# Patient Record
Sex: MALE | Race: BLACK OR AFRICAN AMERICAN | NOT HISPANIC OR LATINO | Employment: FULL TIME | URBAN - METROPOLITAN AREA
[De-identification: names, ages, dates, MRNs, and addresses within clinical notes are randomized per-mention and may not be internally consistent; named-entity substitution may affect disease eponyms.]

---

## 2017-06-23 ENCOUNTER — TRANSCRIBE ORDERS (OUTPATIENT)
Dept: ADMINISTRATIVE | Facility: HOSPITAL | Age: 67
End: 2017-06-23

## 2017-06-23 DIAGNOSIS — R01.1 UNDIAGNOSED CARDIAC MURMURS: Primary | ICD-10-CM

## 2017-07-11 ENCOUNTER — HOSPITAL ENCOUNTER (OUTPATIENT)
Dept: NON INVASIVE DIAGNOSTICS | Facility: HOSPITAL | Age: 67
Discharge: HOME | End: 2017-07-11
Payer: MEDICARE

## 2017-07-11 DIAGNOSIS — R01.1 UNDIAGNOSED CARDIAC MURMURS: ICD-10-CM

## 2017-07-11 PROCEDURE — 93306 TTE W/DOPPLER COMPLETE: CPT

## 2017-07-20 ENCOUNTER — HOSPITAL ENCOUNTER (OUTPATIENT)
Dept: NON INVASIVE DIAGNOSTICS | Facility: HOSPITAL | Age: 67
Discharge: HOME | End: 2017-07-20
Attending: INTERNAL MEDICINE
Payer: MEDICARE

## 2017-07-20 DIAGNOSIS — R01.1 CARDIAC MURMUR: ICD-10-CM

## 2017-07-20 PROCEDURE — 93308 TTE F-UP OR LMTD: CPT

## 2021-02-16 ENCOUNTER — TRANSCRIBE ORDERS (OUTPATIENT)
Dept: ADMINISTRATIVE | Facility: HOSPITAL | Age: 71
End: 2021-02-16

## 2021-02-16 DIAGNOSIS — I10 HYPERTENSION: Primary | ICD-10-CM

## 2021-02-24 ENCOUNTER — HOSPITAL ENCOUNTER (OUTPATIENT)
Dept: NON INVASIVE DIAGNOSTICS | Facility: HOSPITAL | Age: 71
Discharge: HOME | End: 2021-02-24
Payer: COMMERCIAL

## 2021-02-24 DIAGNOSIS — I10 HYPERTENSION: ICD-10-CM

## 2021-02-24 PROCEDURE — 93306 TTE W/DOPPLER COMPLETE: CPT

## 2021-02-24 PROCEDURE — 93306 TTE W/DOPPLER COMPLETE: CPT | Performed by: INTERNAL MEDICINE

## 2021-03-15 ENCOUNTER — IMMUNIZATIONS (OUTPATIENT)
Dept: FAMILY MEDICINE CLINIC | Facility: HOSPITAL | Age: 71
End: 2021-03-15

## 2021-03-15 DIAGNOSIS — Z23 ENCOUNTER FOR IMMUNIZATION: Primary | ICD-10-CM

## 2021-03-15 PROCEDURE — 91301 SARS-COV-2 / COVID-19 MRNA VACCINE (MODERNA) 100 MCG: CPT

## 2021-03-15 PROCEDURE — 0011A SARS-COV-2 / COVID-19 MRNA VACCINE (MODERNA) 100 MCG: CPT

## 2021-04-14 ENCOUNTER — IMMUNIZATIONS (OUTPATIENT)
Dept: FAMILY MEDICINE CLINIC | Facility: HOSPITAL | Age: 71
End: 2021-04-14

## 2021-04-14 DIAGNOSIS — Z23 ENCOUNTER FOR IMMUNIZATION: Primary | ICD-10-CM

## 2021-04-14 PROCEDURE — 91301 SARS-COV-2 / COVID-19 MRNA VACCINE (MODERNA) 100 MCG: CPT

## 2021-04-14 PROCEDURE — 0012A SARS-COV-2 / COVID-19 MRNA VACCINE (MODERNA) 100 MCG: CPT

## 2024-07-26 ENCOUNTER — HOSPITAL ENCOUNTER (EMERGENCY)
Dept: VASCULAR ULTRASOUND | Facility: HOSPITAL | Age: 74
Discharge: HOME | End: 2024-07-26
Payer: COMMERCIAL

## 2024-07-26 ENCOUNTER — APPOINTMENT (EMERGENCY)
Dept: RADIOLOGY | Facility: HOSPITAL | Age: 74
End: 2024-07-26
Payer: COMMERCIAL

## 2024-07-26 ENCOUNTER — TELEPHONE (OUTPATIENT)
Age: 74
End: 2024-07-26

## 2024-07-26 ENCOUNTER — HOSPITAL ENCOUNTER (EMERGENCY)
Facility: HOSPITAL | Age: 74
Discharge: HOME | End: 2024-07-26
Attending: EMERGENCY MEDICINE
Payer: COMMERCIAL

## 2024-07-26 VITALS
DIASTOLIC BLOOD PRESSURE: 75 MMHG | HEART RATE: 56 BPM | TEMPERATURE: 98.4 F | SYSTOLIC BLOOD PRESSURE: 168 MMHG | OXYGEN SATURATION: 98 % | RESPIRATION RATE: 18 BRPM

## 2024-07-26 DIAGNOSIS — I82.412 ACUTE DEEP VEIN THROMBOSIS (DVT) OF FEMORAL VEIN OF LEFT LOWER EXTREMITY (HCC): Primary | ICD-10-CM

## 2024-07-26 LAB
ALBUMIN SERPL BCG-MCNC: 4.2 G/DL (ref 3.5–5)
ALP SERPL-CCNC: 63 U/L (ref 34–104)
ALT SERPL W P-5'-P-CCNC: 21 U/L (ref 7–52)
ANION GAP SERPL CALCULATED.3IONS-SCNC: 8 MMOL/L (ref 4–13)
APTT PPP: 30 SECONDS (ref 23–37)
AST SERPL W P-5'-P-CCNC: 23 U/L (ref 13–39)
BASOPHILS # BLD AUTO: 0.05 THOUSANDS/ÂΜL (ref 0–0.1)
BASOPHILS NFR BLD AUTO: 1 % (ref 0–1)
BILIRUB SERPL-MCNC: 0.59 MG/DL (ref 0.2–1)
BUN SERPL-MCNC: 15 MG/DL (ref 5–25)
CALCIUM SERPL-MCNC: 9.7 MG/DL (ref 8.4–10.2)
CHLORIDE SERPL-SCNC: 104 MMOL/L (ref 96–108)
CK SERPL-CCNC: 165 U/L (ref 39–308)
CO2 SERPL-SCNC: 26 MMOL/L (ref 21–32)
CREAT SERPL-MCNC: 1.07 MG/DL (ref 0.6–1.3)
D DIMER PPP FEU-MCNC: 2.82 UG/ML FEU
DEPRECATED AT III PPP: 91 % OF NORMAL (ref 92–136)
EOSINOPHIL # BLD AUTO: 0.16 THOUSAND/ÂΜL (ref 0–0.61)
EOSINOPHIL NFR BLD AUTO: 3 % (ref 0–6)
ERYTHROCYTE [DISTWIDTH] IN BLOOD BY AUTOMATED COUNT: 11.8 % (ref 11.6–15.1)
GFR SERPL CREATININE-BSD FRML MDRD: 68 ML/MIN/1.73SQ M
GLUCOSE SERPL-MCNC: 119 MG/DL (ref 65–140)
HCT VFR BLD AUTO: 39.8 % (ref 36.5–49.3)
HGB BLD-MCNC: 12.9 G/DL (ref 12–17)
IMM GRANULOCYTES # BLD AUTO: 0.02 THOUSAND/UL (ref 0–0.2)
IMM GRANULOCYTES NFR BLD AUTO: 0 % (ref 0–2)
INR PPP: 1 (ref 0.84–1.19)
LYMPHOCYTES # BLD AUTO: 1.79 THOUSANDS/ÂΜL (ref 0.6–4.47)
LYMPHOCYTES NFR BLD AUTO: 34 % (ref 14–44)
MCH RBC QN AUTO: 29.2 PG (ref 26.8–34.3)
MCHC RBC AUTO-ENTMCNC: 32.4 G/DL (ref 31.4–37.4)
MCV RBC AUTO: 90 FL (ref 82–98)
MONOCYTES # BLD AUTO: 0.64 THOUSAND/ÂΜL (ref 0.17–1.22)
MONOCYTES NFR BLD AUTO: 12 % (ref 4–12)
NEUTROPHILS # BLD AUTO: 2.62 THOUSANDS/ÂΜL (ref 1.85–7.62)
NEUTS SEG NFR BLD AUTO: 50 % (ref 43–75)
NRBC BLD AUTO-RTO: 0 /100 WBCS
PLATELET # BLD AUTO: 191 THOUSANDS/UL (ref 149–390)
PMV BLD AUTO: 10 FL (ref 8.9–12.7)
POTASSIUM SERPL-SCNC: 3.8 MMOL/L (ref 3.5–5.3)
PROT SERPL-MCNC: 7.2 G/DL (ref 6.4–8.4)
PROTHROMBIN TIME: 13.8 SECONDS (ref 11.6–14.5)
RBC # BLD AUTO: 4.42 MILLION/UL (ref 3.88–5.62)
SODIUM SERPL-SCNC: 138 MMOL/L (ref 135–147)
WBC # BLD AUTO: 5.28 THOUSAND/UL (ref 4.31–10.16)

## 2024-07-26 PROCEDURE — 85306 CLOT INHIBIT PROT S FREE: CPT

## 2024-07-26 PROCEDURE — 96374 THER/PROPH/DIAG INJ IV PUSH: CPT

## 2024-07-26 PROCEDURE — 85732 THROMBOPLASTIN TIME PARTIAL: CPT

## 2024-07-26 PROCEDURE — 85598 HEXAGNAL PHOSPH PLTLT NEUTRL: CPT

## 2024-07-26 PROCEDURE — 85379 FIBRIN DEGRADATION QUANT: CPT | Performed by: EMERGENCY MEDICINE

## 2024-07-26 PROCEDURE — 93971 EXTREMITY STUDY: CPT

## 2024-07-26 PROCEDURE — 85025 COMPLETE CBC W/AUTO DIFF WBC: CPT

## 2024-07-26 PROCEDURE — 82550 ASSAY OF CK (CPK): CPT | Performed by: EMERGENCY MEDICINE

## 2024-07-26 PROCEDURE — 73590 X-RAY EXAM OF LOWER LEG: CPT

## 2024-07-26 PROCEDURE — 93971 EXTREMITY STUDY: CPT | Performed by: SURGERY

## 2024-07-26 PROCEDURE — 80053 COMPREHEN METABOLIC PANEL: CPT

## 2024-07-26 PROCEDURE — 85613 RUSSELL VIPER VENOM DILUTED: CPT

## 2024-07-26 PROCEDURE — 99284 EMERGENCY DEPT VISIT MOD MDM: CPT

## 2024-07-26 PROCEDURE — 85705 THROMBOPLASTIN INHIBITION: CPT

## 2024-07-26 PROCEDURE — 85670 THROMBIN TIME PLASMA: CPT

## 2024-07-26 PROCEDURE — 85610 PROTHROMBIN TIME: CPT

## 2024-07-26 PROCEDURE — 85303 CLOT INHIBIT PROT C ACTIVITY: CPT

## 2024-07-26 PROCEDURE — 99285 EMERGENCY DEPT VISIT HI MDM: CPT | Performed by: EMERGENCY MEDICINE

## 2024-07-26 PROCEDURE — 85305 CLOT INHIBIT PROT S TOTAL: CPT

## 2024-07-26 PROCEDURE — 86146 BETA-2 GLYCOPROTEIN ANTIBODY: CPT

## 2024-07-26 PROCEDURE — 85300 ANTITHROMBIN III ACTIVITY: CPT

## 2024-07-26 PROCEDURE — 86147 CARDIOLIPIN ANTIBODY EA IG: CPT

## 2024-07-26 PROCEDURE — 36415 COLL VENOUS BLD VENIPUNCTURE: CPT

## 2024-07-26 PROCEDURE — 85730 THROMBOPLASTIN TIME PARTIAL: CPT

## 2024-07-26 RX ORDER — ACETAMINOPHEN 325 MG/1
975 TABLET ORAL ONCE
Status: COMPLETED | OUTPATIENT
Start: 2024-07-26 | End: 2024-07-26

## 2024-07-26 RX ORDER — KETOROLAC TROMETHAMINE 30 MG/ML
15 INJECTION, SOLUTION INTRAMUSCULAR; INTRAVENOUS ONCE
Status: COMPLETED | OUTPATIENT
Start: 2024-07-26 | End: 2024-07-26

## 2024-07-26 RX ADMIN — KETOROLAC TROMETHAMINE 15 MG: 30 INJECTION, SOLUTION INTRAMUSCULAR; INTRAVENOUS at 02:58

## 2024-07-26 RX ADMIN — APIXABAN 10 MG: 5 TABLET, FILM COATED ORAL at 08:29

## 2024-07-26 RX ADMIN — ACETAMINOPHEN 975 MG: 325 TABLET, FILM COATED ORAL at 02:57

## 2024-07-26 NOTE — TELEPHONE ENCOUNTER
Pt's wife Mey calling to schedule DVT hospital f/u appt. Pt has LEV 7/26/24 showing acute DVT. Pt has eliquis supply for next 28 days. Per protocol looking for 3 week appt. Given appt on 9/6/24 with [facility] in [place]. Please review and call pt for an appt within timeframe, they are willing to travel and can be reached at either phone number in chart.

## 2024-07-26 NOTE — ED PROVIDER NOTES
History  Chief Complaint   Patient presents with    Leg Pain     Pt reports leg pain and swelling in left lower leg starting a couple days ago. Denies injury     HPI    73-year-old male presents for evaluation of left leg pain.  He reports 4 days of left lower extremity pain, swelling that acutely worsened overnight.  He saw his PCP 2 days ago, and has a scheduled vascular ultrasound to rule out DVT next Wednesday.  However, pain intensified overnight, and woke him up from sleep, so he presented to ED.  Denies recent travel, long immobilization, cancer diagnosis.  Denies nausea, vomiting, abdominal pain, chest pain, SOB.    None       Past Medical History:   Diagnosis Date    Hypertension        History reviewed. No pertinent surgical history.    History reviewed. No pertinent family history.  I have reviewed and agree with the history as documented.    E-Cigarette/Vaping     E-Cigarette/Vaping Substances     Social History     Tobacco Use    Smoking status: Never    Smokeless tobacco: Never   Substance Use Topics    Alcohol use: Never    Drug use: Never        Review of Systems   Constitutional:  Negative for chills and fever.   Respiratory:  Negative for chest tightness and shortness of breath.    Cardiovascular:  Negative for chest pain.   Gastrointestinal:  Negative for abdominal pain, nausea and vomiting.   Musculoskeletal:  Positive for myalgias.   Neurological:  Negative for dizziness and headaches.       Physical Exam  ED Triage Vitals   Temperature Pulse Respirations Blood Pressure SpO2   07/26/24 0202 07/26/24 0201 07/26/24 0201 07/26/24 0202 07/26/24 0202   98.4 °F (36.9 °C) 78 20 (!) 202/91 97 %      Temp Source Heart Rate Source Patient Position - Orthostatic VS BP Location FiO2 (%)   07/26/24 0202 07/26/24 0201 07/26/24 0300 07/26/24 0300 --   Oral Monitor Sitting Right arm       Pain Score       07/26/24 0257       5             Orthostatic Vital Signs  Vitals:    07/26/24 0300 07/26/24 0400 07/26/24  0530 07/26/24 0630   BP: (!) 184/83 (!) 173/77 (!) 173/81 168/75   Pulse: 64 56 57 56   Patient Position - Orthostatic VS: Sitting Sitting Sitting Sitting       Physical Exam  Vitals and nursing note reviewed.   Constitutional:       General: He is not in acute distress.     Appearance: He is well-developed.   HENT:      Head: Normocephalic and atraumatic.   Eyes:      Conjunctiva/sclera: Conjunctivae normal.   Cardiovascular:      Rate and Rhythm: Normal rate and regular rhythm.      Heart sounds: No murmur heard.  Pulmonary:      Effort: Pulmonary effort is normal. No respiratory distress.      Breath sounds: Normal breath sounds.   Abdominal:      Palpations: Abdomen is soft.      Tenderness: There is no abdominal tenderness.   Musculoskeletal:         General: Swelling present.      Cervical back: Neck supple.      Comments: Tenderness to palpation in left calf, with swelling also noted. LLE > RLE.  DP pulses normal, full ROM in BLE joints.   Skin:     General: Skin is warm and dry.      Capillary Refill: Capillary refill takes less than 2 seconds.   Neurological:      General: No focal deficit present.      Mental Status: He is alert and oriented to person, place, and time.         ED Medications  Medications   ketorolac (TORADOL) injection 15 mg (15 mg Intravenous Given 7/26/24 0258)   acetaminophen (TYLENOL) tablet 975 mg (975 mg Oral Given 7/26/24 0257)   apixaban (ELIQUIS) tablet 10 mg (10 mg Oral Given 7/26/24 0829)       Diagnostic Studies  Results Reviewed       Procedure Component Value Units Date/Time    Antithrombin III Activity [[id number]] Collected: 07/26/24 0804    Lab Status: In process Specimen: Blood from Arm, Left Updated: 07/26/24 0818    Protein S activity [[id number]] Collected: 07/26/24 0804    Lab Status: In process Specimen: Blood from Arm, Left Updated: 07/26/24 0818    Cardiolipin antibody [[id number]] Collected: 07/26/24 0804    Lab Status: In process Specimen: Blood from Arm, Left  Updated: 07/26/24 0818    Lupus anticoagulant [[id number]] Collected: 07/26/24 0804    Lab Status: In process Specimen: Blood from Arm, Left Updated: 07/26/24 0818    Beta-2 glycoprotein antibodies [[id number]] Collected: 07/26/24 0804    Lab Status: In process Specimen: Blood from Arm, Left Updated: 07/26/24 0818    Protein C activity [[id number]] Collected: 07/26/24 0804    Lab Status: In process Specimen: Blood from Arm, Left Updated: 07/26/24 0818    Protein S Antigen, Total & Free [[id number]] Collected: 07/26/24 0804    Lab Status: In process Specimen: Blood from Arm, Left Updated: 07/26/24 0818    Comprehensive metabolic panel [[id number]] Collected: 07/26/24 0254    Lab Status: Final result Specimen: Blood from Arm, Left Updated: 07/26/24 0329     Sodium 138 mmol/L      Potassium 3.8 mmol/L      Chloride 104 mmol/L      CO2 26 mmol/L      ANION GAP 8 mmol/L      BUN 15 mg/dL      Creatinine 1.07 mg/dL      Glucose 119 mg/dL      Calcium 9.7 mg/dL      AST 23 U/L      ALT 21 U/L      Alkaline Phosphatase 63 U/L      Total Protein 7.2 g/dL      Albumin 4.2 g/dL      Total Bilirubin 0.59 mg/dL      eGFR 68 ml/min/1.73sq m     Narrative:      National Kidney Disease Foundation guidelines for Chronic Kidney Disease (CKD):     Stage 1 with normal or high GFR (GFR > 90 mL/min/1.73 square meters)    Stage 2 Mild CKD (GFR = 60-89 mL/min/1.73 square meters)    Stage 3A Moderate CKD (GFR = 45-59 mL/min/1.73 square meters)    Stage 3B Moderate CKD (GFR = 30-44 mL/min/1.73 square meters)    Stage 4 Severe CKD (GFR = 15-29 mL/min/1.73 square meters)    Stage 5 End Stage CKD (GFR <15 mL/min/1.73 square meters)  Note: GFR calculation is accurate only with a steady state creatinine    CK [[id number]]  (Normal) Collected: 07/26/24 0254    Lab Status: Final result Specimen: Blood from Arm, Left Updated: 07/26/24 0329     Total  U/L     D-Dimer [[id number]]  (Abnormal) Collected: 07/26/24 0254    Lab Status: Final result  Specimen: Blood from Arm, Left Updated: 07/26/24 0324     D-Dimer, Quant 2.82 ug/ml FEU     Narrative:      In the evaluation for possible pulmonary embolism, in the appropriate (Well's Score of 4 or less) patient, the age adjusted d-dimer cutoff for this patient can be calculated as:    Age x 0.01 (in ug/mL) for Age-adjusted D-dimer exclusion threshold for a patient over 50 years.    Protime-INR [[id number]]  (Normal) Collected: 07/26/24 0254    Lab Status: Final result Specimen: Blood from Arm, Left Updated: 07/26/24 0322     Protime 13.8 seconds      INR 1.00    APTT [[id number]]  (Normal) Collected: 07/26/24 0254    Lab Status: Final result Specimen: Blood from Arm, Left Updated: 07/26/24 0322     PTT 30 seconds     CBC and differential [[id number]] Collected: 07/26/24 0254    Lab Status: Final result Specimen: Blood from Arm, Left Updated: 07/26/24 0316     WBC 5.28 Thousand/uL      RBC 4.42 Million/uL      Hemoglobin 12.9 g/dL      Hematocrit 39.8 %      MCV 90 fL      MCH 29.2 pg      MCHC 32.4 g/dL      RDW 11.8 %      MPV 10.0 fL      Platelets 191 Thousands/uL      nRBC 0 /100 WBCs      Segmented % 50 %      Immature Grans % 0 %      Lymphocytes % 34 %      Monocytes % 12 %      Eosinophils Relative 3 %      Basophils Relative 1 %      Absolute Neutrophils 2.62 Thousands/µL      Absolute Immature Grans 0.02 Thousand/uL      Absolute Lymphocytes 1.79 Thousands/µL      Absolute Monocytes 0.64 Thousand/µL      Eosinophils Absolute 0.16 Thousand/µL      Basophils Absolute 0.05 Thousands/µL                    XR tibia fibula 2 vw left   ED Interpretation by Rochelle Hernandez MD (07/26 0528)   Wet read: No acute fractures or dislocations noted.      VAS lower limb venous duplex study, unilateral/limited    (Results Pending)         Procedures  Procedures      ED Course                                   Andres' Criteria for DVT      Flowsheet Row Most Recent Value   Andres' Criteria for DVT    Active cancer  Treatment or palliation within 6 months 0 Filed at: 07/26/2024 0746   Bedridden recently >3 days or major surgery within 12 weeks 0 Filed at: 07/26/2024 0746   Calf swelling >3 cm compared to the other leg 1 Filed at: 07/26/2024 0746   Entire leg swollen 0 Filed at: 07/26/2024 0746   Collateral (nonvaricose) superficial veins present 0 Filed at: 07/26/2024 0746   Localized tenderness along the deep venous system 1 Filed at: 07/26/2024 0746   Pitting edema, confined to symptomatic leg 0 Filed at: 07/26/2024 0746   Paralysis, paresis, or recent plaster immobilization of the lower extremity 0 Filed at: 07/26/2024 0746   Previously documented DVT 0 Filed at: 07/26/2024 0746   Alternative diagnosis to DVT as likely or more likely 0 Filed at: 07/26/2024 0746   Wells DVT Critera Score 2 Filed at: 07/26/2024 0746            Medical Decision Making  73-year-old male presents for evaluation of lower extremity pain.    On initial evaluation, patient in no acute distress, resting comfortably in bed.  Lower extremity asymmetry noted, with LLE >RLE.  Tenderness of L calf, with swelling noted.  Physical exam otherwise unremarkable.  High suspicion for DVT.  Labs, x-ray of LLE, D-dimer ordered.  Toradol and Tylenol given for symptomatic relief.  Vascular ultrasound ordered.  Per vascular tech, patient positive for DVT in left mid and distal femoral, popliteal and proximal posterior and peroneal veins.  Patient started on Eliquis, first dose given in ED.  Referral to vascular surgery placed, and thrombosis panel.  Strict return precautions discussed with patient, and he is stable for dispo.    Amount and/or Complexity of Data Reviewed  Labs: ordered.  Radiology: ordered and independent interpretation performed.    Risk  OTC drugs.  Prescription drug management.          Disposition  Final diagnoses:   Acute deep vein thrombosis (DVT) of femoral vein of left lower extremity (HCC)     Time reflects when diagnosis was documented in  both MDM as applicable and the Disposition within this note       Time User Action Codes Description Comment    7/26/2024  7:57 AM Rochelle Hernandez Add [I82.412] Acute deep vein thrombosis (DVT) of femoral vein of left lower extremity (HCC)           ED Disposition       ED Disposition   Discharge    Condition   Stable    Date/Time   Fri Jul 26, 2024 0801    Comment   Jeffry Mcclellan discharge to home/self care.                   Follow-up Information       Follow up With Specialties Details Why Contact Info    Karli Marsh MD Family Medicine Schedule an appointment as soon as possible for a visit   [address] [facility] [address]  [place] [address]  [phone]              Patient's Medications   Discharge Prescriptions    APIXABAN (ELIQUIS) 5 MG    Take 2 tablets (10 mg total) by mouth 2 (two) times a day for 7 days, THEN 1 tablet (5 mg total) 2 (two) times a day for 21 days. Then 5 mg (one tablet) twice daily after 1 week..       Start Date: 7/26/2024 End Date: 8/23/2024       Order Dose: --       Quantity: 70 tablet    Refills: 0         PDMP Review         Value Time User    PDMP Reviewed  Yes 7/26/2024  1:57 AM Phi Obando MD             ED Provider  Attending physically available and evaluated Jeffry Mcclellan. I managed the patient along with the ED Attending.    Electronically Signed by           Rochelle Hernandez MD  07/26/24 0859

## 2024-07-26 NOTE — ED ATTENDING ATTESTATION
7/26/2024  I, Phi Obando MD, saw and evaluated the patient. I have discussed the patient with the resident/non-physician practitioner and agree with the resident's/non-physician practitioner's findings, Plan of Care, and MDM as documented in the resident's/non-physician practitioner's note, except where noted. All available labs and Radiology studies were reviewed.  I was present for key portions of any procedure(s) performed by the resident/non-physician practitioner and I was immediately available to provide assistance.       At this point I agree with the current assessment done in the Emergency Department.  I have conducted an independent evaluation of this patient a history and physical is as follows: Patient is a 73 year old male with 4-5 days of left lower leg swelling and pain. No trauma. No travel. No sob. No fever. No N/V. No cough. No chest pain. Was last seen at [facility] ED on 6/5/17 for gout of left knee. [facility] website checked on this patient and no Rx found. NCAT. Moist mucous membranes. Lungs clear. Heart regular without murmur.  Abdomen soft and nontender. Good bowel sounds. No rash noted. (+) left calf tenderness with mild swelling. NVI. DDX including but not limited to: DVT, Baker's cyst, cellulitis, strain, rhabdomyolysis, metabolic abnormality, fracture; doubt arterial occlusion. Will check labs and x-ray and get doppler US in AM.     ED Course         Critical Care Time  Procedures

## 2024-07-29 LAB
APTT HEX PL PPP: 4 SEC (ref 0–11)
APTT SCREEN TO CONFIRM RATIO: 1.17 RATIO (ref 0–1.34)
APTT-LA IMM 4:1 NP PPP: 44.3 SEC (ref 0–40.5)
CONFIRM APTT/NORMAL: 35.1 SEC (ref 0–47.6)
DRVVT IMM 1:2 NP PPP: 43.7 SEC (ref 0–40.4)
DRVVT SCREEN TO CONFIRM RATIO: 1.3 RATIO (ref 0.8–1.2)
LA PPP-IMP: ABNORMAL
PROT C AG ACT/NOR PPP IA: 95 % OF NORMAL (ref 60–150)
PROT S ACT/NOR PPP: 101 % (ref 61–136)
PROT S ACT/NOR PPP: 93 % (ref 71–117)
PROT S PPP-ACNC: 107 % (ref 60–150)
SCREEN APTT: 48.1 SEC (ref 0–43.5)
SCREEN DRVVT: 51.4 SEC (ref 0–47)
THROMBIN TIME: 17.8 SEC (ref 0–23)

## 2024-07-30 LAB
B2 GLYCOPROT1 IGA SERPL IA-ACNC: 1.6
B2 GLYCOPROT1 IGG SERPL IA-ACNC: 0.9
B2 GLYCOPROT1 IGM SERPL IA-ACNC: <2.4
CARDIOLIPIN IGA SER IA-ACNC: 4.1
CARDIOLIPIN IGG SER IA-ACNC: 0.9
CARDIOLIPIN IGM SER IA-ACNC: 1

## 2024-09-04 ENCOUNTER — TELEPHONE (OUTPATIENT)
Dept: VASCULAR SURGERY | Facility: HOSPITAL | Age: 74
End: 2024-09-04

## 2024-10-08 ENCOUNTER — CONSULT (OUTPATIENT)
Dept: HEMATOLOGY ONCOLOGY | Facility: CLINIC | Age: 74
End: 2024-10-08
Payer: COMMERCIAL

## 2024-10-08 VITALS
WEIGHT: 170 LBS | TEMPERATURE: 97.9 F | SYSTOLIC BLOOD PRESSURE: 180 MMHG | RESPIRATION RATE: 17 BRPM | OXYGEN SATURATION: 97 % | HEIGHT: 65 IN | BODY MASS INDEX: 28.32 KG/M2 | HEART RATE: 78 BPM | DIASTOLIC BLOOD PRESSURE: 100 MMHG

## 2024-10-08 DIAGNOSIS — I82.412 ACUTE DEEP VEIN THROMBOSIS (DVT) OF FEMORAL VEIN OF LEFT LOWER EXTREMITY (HCC): ICD-10-CM

## 2024-10-08 PROCEDURE — 99203 OFFICE O/P NEW LOW 30 MIN: CPT

## 2024-10-08 RX ORDER — AMLODIPINE BESYLATE 5 MG/1
5 TABLET ORAL DAILY
COMMUNITY

## 2024-10-08 RX ORDER — LOSARTAN POTASSIUM 100 MG/1
25 TABLET ORAL DAILY
COMMUNITY

## 2024-10-08 NOTE — PROGRESS NOTES
"  Oncology Outpatient Consult Note  Jeffry Mcclellan 73 y.o. male MRN: @ Encounter: [id number]        Date:  10/8/2024        CC:  Acute LLE DVT      HPI:  Jeffry Mcclellan is seen for initial consultation 10/8/2024 regarding acute LLE DVT.  Patient has a PMH of hypertension.      Patient presented to the ED on 7/26/2024 with LLE edema/pain for over a week.  A venous duplex revealed:  Evidence of acute and subacute vs. acute deep vein thrombosis was noted in the mid and distal femoral, popliteal and proximal posterior and peroneal veins.  No evidence of superficial thrombophlebitis noted.  Doppler evaluation shows a normal response to augmentation maneuvers.  Popliteal, posterior tibial and anterior tibial arterial Doppler waveform's are triphasic  Patient was started on Eliquis BID.      Patient denies recent surgeries, trauma, COVID infection, long distance travel, or prolonged immobility.  Patient reports he has not had a colonoscopy but had a cologuard test \"a few years ago,\" which, per patient, was negative. His PSA 12/2023 was 1.02.      Patient is currently working.  He denies increased fatigue, fevers, frequent infections, drenching night sweats, unintentional weight loss, decreased appetite.  No abdominal pain, diarrhea or constipation.    Thrombosis panel:  Protein S Ag, Total = 107%  Protein S Ag, Free = 101 %  Protein S Activity = 93%  Protein C Activity = 95%  Antithrombin III activity = 91%    Lupus anticoagulant = DETECTED  Beta-2 glycoprotein antibodies:  IgG = 0.9  IgA = 1.6  IgM = <2.4    Anticardiolipin antibodies:  IgG = 0.9  IgA = 4.1  IgM = 1.0    Patient reports no family history of blood clots or cancer.    ROS: As stated in history of present illness otherwise her 14 point review of systems today was negative.    ECOG PS: 0  Test Results:    Imaging: No results found.    Labs:   Lab Results   Component Value Date    WBC 5.28 07/26/2024    HGB 12.9 07/26/2024    HCT 39.8 07/26/2024    MCV 90 " 07/26/2024     07/26/2024     Lab Results   Component Value Date    K 3.8 07/26/2024     07/26/2024    CO2 26 07/26/2024    BUN 15 07/26/2024    CREATININE 1.07 07/26/2024    CALCIUM 9.7 07/26/2024    AST 23 07/26/2024    ALT 21 07/26/2024    ALKPHOS 63 07/26/2024    EGFR 68 07/26/2024   Active Problems: There is no problem list on file for this patient.      Past Medical History:   Past Medical History:   Diagnosis Date    Hypertension        Surgical History: No past surgical history on file.    Family History:  No family history on file.    Cancer-related family history is not on file.    Social History:   Social History     Socioeconomic History    Marital status: /Civil Union     Spouse name: Not on file    Number of children: Not on file    Years of education: Not on file    Highest education level: Not on file   Occupational History    Not on file   Tobacco Use    Smoking status: Never    Smokeless tobacco: Never   Substance and Sexual Activity    Alcohol use: Never    Drug use: Never    Sexual activity: Not on file   Other Topics Concern    Not on file   Social History Narrative    Not on file     Social Determinants of Health     Financial Resource Strain: Not on file   Food Insecurity: Not on file   Transportation Needs: Not on file   Physical Activity: Not on file   Stress: Not on file   Social Connections: Not on file   Intimate Partner Violence: Not on file   Housing Stability: Not on file       Current Medications:   Current Outpatient Medications   Medication Sig Dispense Refill    amLODIPine (NORVASC) 5 mg tablet Take 5 mg by mouth daily      losartan (COZAAR) 100 MG tablet Take 25 mg by mouth daily      apixaban (ELIQUIS) 5 mg Take 2 tablets (10 mg total) by mouth 2 (two) times a day for 7 days, THEN 1 tablet (5 mg total) 2 (two) times a day for 21 days. Then 5 mg (one tablet) twice daily after 1 week.. 70 tablet 0     No current facility-administered medications for this visit.        Allergies: No Known Allergies      Physical Exam:    Body surface area is 1.85 meters squared.    Wt Readings from Last 3 Encounters:   10/08/24 77.1 kg (170 lb)        Temp Readings from Last 3 Encounters:   10/08/24 97.9 °F (36.6 °C)   07/26/24 98.4 °F (36.9 °C) (Oral)        BP Readings from Last 3 Encounters:   10/08/24 (!) 180/100   07/26/24 168/75         Pulse Readings from Last 3 Encounters:   10/08/24 78   07/26/24 56     @LASTSAO2(3)@    Physical Exam     Constitutional   General appearance: No acute distress, well appearing and well nourished.    Eyes   Conjunctiva and lids: No swelling, erythema or discharge.    Pupils and irises: Equal, round and reactive to light.    Ears, Nose, Mouth, and Throat   External inspection of ears and nose: Normal.    Nasal mucosa, septum, and turbinates: Normal without edema or erythema.    Oropharynx: Normal with no erythema, edema, exudate or lesions.    Pulmonary   Respiratory effort: No increased work of breathing or signs of respiratory distress.    Auscultation of lungs: Clear to auscultation.    Cardiovascular   Palpation of heart: Normal PMI, no thrills.    Auscultation of heart: Normal rate and rhythm, normal S1 and S2, without murmurs.    Examination of extremities for edema and/or varicosities: Normal.    Carotid pulses: Normal.    Abdomen   Abdomen: Non-tender, no masses.    Liver and spleen: No hepatomegaly or splenomegaly.    Lymphatic   Palpation of lymph nodes in neck: No lymphadenopathy.    Musculoskeletal   Gait and station: Normal.    Digits and nails: Normal without clubbing or cyanosis.    Inspection/palpation of joints, bones, and muscles: Normal.    Skin   Skin and subcutaneous tissue: Normal without rashes or lesions.    Neurologic   Cranial nerves: Cranial nerves 2-12 intact.    Sensation: No sensory loss.    Psychiatric   Orientation to person, place, and time: Normal.    Mood and affect: Normal.          Assessment/ Plan:  73-year old with  an acute LLE DVT.  Discussed provoking risk factors for VTEs, which include but not limited to, increasing age, prolonged immobility, surgery, trauma, malignancy, , CHF, blood disorders that alter blood viscosity.  We reviewed patient's clot was unprovoked.  Since it is unprovoked, patient's age, recommend anticoagulation indefinitely.    We reviewed his thrombosis panel.  The presence of a lupus anticoagulant was detected. Discussed we have to repeat in 12 weeks for persistent positive however, the results are effected by anticoagulation such as Eliquis and may cause a false positive.      A study conducted suggests to hold Xa inhibitors (Xarelto and Eliquis) the prior to and the day of testing allows for valid assessment of lupus anticoagulant testing Lupus Anticoagulant Testing in Patients Receiving Rivaroxaban or Apixaban for the Treatment of Venous Thromboembolism  Blood  American Society of Hematology (ashpublications.org)     Recommend he hold it the day before and the day of testing.  Recommend he take ASA 81 mg on those days.  He can restart eliquis right after he obtains the labs.  During the time he is off anticoagulation, be vigilant of his symptoms and to report to the ED should he suspect a blood clot.    If the lupus anticoagulant is positive, recommend patient be on Coumadin with INR goal between 2.5 and 3.5.    Discussed the importance of compliance with taking the medication every day around the same time and not missing a dose.  If a dose is missed, NOT to take a double dose the next day to make up for the missed dose.    Discussed safety concerns while on anticoagulation:  Ambulate in a clear path, avoid falls.  If patient falls, to call 911 and get to the hospital, especially if there is a head injury.    Patient understands to always monitor for signs and symptoms of VTE which I have educated him on. He knows to report to the ED should these occur.    I will call patient with the test results  and formulate a plan. He is aware to contact us for any additional questions/concerns.    I spent 40 minutes in chart review, face to face counseling, coordination of care, and documentation.

## 2024-10-11 ENCOUNTER — TELEPHONE (OUTPATIENT)
Age: 74
End: 2024-10-11

## 2024-10-11 NOTE — TELEPHONE ENCOUNTER
"Patient's wife calling in to get clarification on upcoming lab test and how to take/stop Eliquis.     Per ESTRELLITA Ramirez's note on 10/8:    \"The presence of a lupus anticoagulant was detected. Discussed we have to repeat in 12 weeks for persistent positive however, the results are effected by anticoagulation such as Eliquis and may cause a false positive.       Recommend he hold it the day before and the day of testing.  Recommend he take ASA 81 mg on those days.  He can restart eliquis right after he obtains the labs.  During the time he is off anticoagulation, be vigilant of his symptoms and to report to the ED should he suspect a blood clot.\"    Reviewed the above recommendations with patient's wife. Patient had initial test done on 7/26. Wife would like to clarify when to have blood work drawn, as she was under the impression the labs were to be drawn soon. Is the test to be done 12 weeks after the initial test on 7/26 or 12 weeks from office visit on 10/8?    Patient has appointment on 10/15 with vascular surgeon and would appreciate clarification prior to this.    Please call Mey back at [phone] to discuss, as patient is hard of hearing and she wants to make sure the information is correct.  "

## 2024-10-11 NOTE — TELEPHONE ENCOUNTER
Spoke with Mey to advise her of the plan. She verbalized understanding and is in agreement with the plan.

## 2024-10-11 NOTE — TELEPHONE ENCOUNTER
Patient will hold his Eliquis on 10/27, then he will get his lab test on 10/28.  He will restart the Eliquis after patient obtains the labs on 10/28.

## 2024-10-14 NOTE — PROGRESS NOTES
"Vascular Surgery New Patient Visit  Date: 10/15/24      Assessment:  Jeffry Mcclellan is a 73 y.o. male with unprovoked infrainguinal left lower extremity DVT on appropriate medical therapy (Eliquis).  He is currently following with hematology and undergoing hypercoagulable workup.      Plan:  -No surgical intervention indicated  -Continue following with hematology  -Follow-up in vascular surgery clinic as needed    Diagnoses and all orders for this visit:    Acute deep vein thrombosis (DVT) of femoral vein of left lower extremity (HCC)  -     Ambulatory Referral to Vascular Surgery           Subjective:     HPI:  Jeffry Mcclellan is a 73 y.o. male with no significant past medical history who was referred to clinic to discuss acute left lower extremity DVT.  Denies history of recent trauma, long distance travel, DVT, PE, or hypercoagulable disorders.  In this setting it appears his DVT is unprovoked. Fortunately his LLE swelling is resolved and he feels his leg is back to normal size. He is currently following with hematology and is undergoing hypercoagulable workup.    Review of 7/26/2024 venous duplex demonstrates acute vs subacute DVT in the mid/distal femoral, popliteal, proximal PT/peroneal veins.     He is currently taking Eliquis.    Objective:    ROS:  All systems were reviewed and are negative except those mentioned in HPI and below.      Vitals: /70 (BP Location: Left arm, Patient Position: Sitting, Cuff Size: Large)   Pulse 65   Resp 18   Ht 5' 6\" (1.676 m)   Wt 76.8 kg (169 lb 6.4 oz)   SpO2 97%   BMI 27.34 kg/m²      General: male appears stated age, no apparent distress, alert and oriented   HEENT: normocephalic, atraumatic   Cardiovascular: hemodynamically stable   Chest/Lungs: no increased work of breathing, chest rise equal bilaterally   Abdomen: Soft, ND, NT, no pulsatile masses   Extremities: BL DP/PT pulses   Skin: warm and dry   Neuro: no gross deficits      Medications:  Current " Outpatient Medications   Medication Sig Dispense Refill    amLODIPine (NORVASC) 5 mg tablet Take 5 mg by mouth daily      apixaban (ELIQUIS) 5 mg Take 2 tablets (10 mg total) by mouth 2 (two) times a day for 7 days, THEN 1 tablet (5 mg total) 2 (two) times a day for 21 days. Then 5 mg (one tablet) twice daily after 1 week.. 70 tablet 0    losartan (COZAAR) 100 MG tablet Take 25 mg by mouth daily       No current facility-administered medications for this visit.       Allergies:  No Known Allergies     PMH:  Past Medical History:   Diagnosis Date    Hypertension         PSH:  History reviewed. No pertinent surgical history.     FHx:  History reviewed. No pertinent family history.     SHx:  Social History     Socioeconomic History    Marital status: /Civil Union     Spouse name: Not on file    Number of children: Not on file    Years of education: Not on file    Highest education level: Not on file   Occupational History    Not on file   Tobacco Use    Smoking status: Never    Smokeless tobacco: Never   Substance and Sexual Activity    Alcohol use: Never    Drug use: Never    Sexual activity: Not on file   Other Topics Concern    Not on file   Social History Narrative    Not on file     Social Determinants of Health     Financial Resource Strain: Not on file   Food Insecurity: Not on file   Transportation Needs: Not on file   Physical Activity: Not on file   Stress: Not on file   Social Connections: Not on file   Intimate Partner Violence: Not on file   Housing Stability: Not on file

## 2024-10-15 ENCOUNTER — OFFICE VISIT (OUTPATIENT)
Dept: VASCULAR SURGERY | Facility: CLINIC | Age: 74
End: 2024-10-15
Payer: COMMERCIAL

## 2024-10-15 VITALS
HEART RATE: 65 BPM | OXYGEN SATURATION: 97 % | WEIGHT: 169.4 LBS | SYSTOLIC BLOOD PRESSURE: 156 MMHG | BODY MASS INDEX: 27.23 KG/M2 | DIASTOLIC BLOOD PRESSURE: 70 MMHG | RESPIRATION RATE: 18 BRPM | HEIGHT: 66 IN

## 2024-10-15 DIAGNOSIS — I82.412 ACUTE DEEP VEIN THROMBOSIS (DVT) OF FEMORAL VEIN OF LEFT LOWER EXTREMITY (HCC): ICD-10-CM

## 2024-10-15 PROCEDURE — 99204 OFFICE O/P NEW MOD 45 MIN: CPT | Performed by: STUDENT IN AN ORGANIZED HEALTH CARE EDUCATION/TRAINING PROGRAM

## 2024-10-15 NOTE — PROGRESS NOTES
Ambulatory Visit  Name: Jeffry Mcclellan      : 1950      MRN: [id number]  Encounter Provider: Ez Rudolph MD  Encounter Date: 10/15/2024   Encounter department: [facility]    Assessment & Plan      History of Present Illness   {?Quick Links Encounters * My Last Note * Last Note in Specialty * Snapshot * Since Last Visit * History :49429}  Jeffry Mcclellan is a 73 y.o. male who presents       LEV on 24. Denies any symptoms.    {History obtained from (Optional):28948}  Review of Systems   Constitutional: Negative.    HENT: Negative.     Eyes: Negative.    Respiratory: Negative.     Cardiovascular: Negative.    Gastrointestinal: Negative.    Endocrine: Negative.    Genitourinary: Negative.    Musculoskeletal: Negative.    Skin: Negative.    Allergic/Immunologic: Negative.    Neurological: Negative.    Hematological: Negative.    Psychiatric/Behavioral: Negative.       {Select to Display PMH (Optional):42571}      Objective   {?Quick Links Trend Vitals * Enter New Vitals * Results Review * Timeline (Adult) * Labs * Imaging * Cardiology * Procedures * Lung Cancer Screening * Surgical eConsent :30237}  There were no vitals taken for this visit.    Physical Exam  {Administrative / Billing Section (Optional):13632}

## 2024-10-31 LAB
APTT HEX PL PPP: NORMAL SEC (ref 0–8)
APTT IMM NP PPP: NORMAL SEC
DRVVT SCREEN TO CONFIRM RATIO: NORMAL RATIO
LA PPP-IMP: NORMAL
PATH REPORT.COMMENTS IMP SPEC: NORMAL
REPTILASE TIME: NORMAL SEC (ref 0–20)
SCREEN APTT: 38.2 SEC (ref 32–45)
SCREEN DRVVT: 39.6 SEC (ref 31.4–45)
THROMBIN TIME: NORMAL SEC (ref 0–21)

## 2024-11-01 ENCOUNTER — TELEPHONE (OUTPATIENT)
Dept: HEMATOLOGY ONCOLOGY | Facility: CLINIC | Age: 74
End: 2024-11-01

## 2024-11-01 DIAGNOSIS — I82.412 ACUTE DEEP VEIN THROMBOSIS (DVT) OF FEMORAL VEIN OF LEFT LOWER EXTREMITY (HCC): Primary | ICD-10-CM

## 2024-11-01 NOTE — TELEPHONE ENCOUNTER
Spoke with patient and his wife.  The lupus anticoagulant result was negative.  Therefore, patient can continue taking Eliquis BID indefinitely.  I reviewed that on his chart the eliquis is coming up not active. Patient is taking it BID.   I will send a script with 3 refills.  His PCP can take over as he will be on it indefinitely. He can take it as needed.  Patient aware to contact us for worsening symptoms or for additional questions/concerns.